# Patient Record
Sex: MALE | Race: BLACK OR AFRICAN AMERICAN | Employment: FULL TIME | URBAN - METROPOLITAN AREA
[De-identification: names, ages, dates, MRNs, and addresses within clinical notes are randomized per-mention and may not be internally consistent; named-entity substitution may affect disease eponyms.]

---

## 2018-10-22 ENCOUNTER — HOSPITAL ENCOUNTER (EMERGENCY)
Facility: HOSPITAL | Age: 33
Discharge: HOME | End: 2018-10-22
Attending: EMERGENCY MEDICINE
Payer: MEDICAID

## 2018-10-22 ENCOUNTER — APPOINTMENT (OUTPATIENT)
Dept: GENERAL RADIOLOGY | Facility: HOSPITAL | Age: 33
End: 2018-10-22
Attending: EMERGENCY MEDICINE
Payer: MEDICAID

## 2018-10-22 VITALS
SYSTOLIC BLOOD PRESSURE: 114 MMHG | WEIGHT: 185 LBS | BODY MASS INDEX: 28.04 KG/M2 | HEIGHT: 68 IN | OXYGEN SATURATION: 100 % | DIASTOLIC BLOOD PRESSURE: 60 MMHG | RESPIRATION RATE: 16 BRPM | HEART RATE: 51 BPM | TEMPERATURE: 98 F

## 2018-10-22 DIAGNOSIS — K59.00 CONSTIPATION, UNSPECIFIED CONSTIPATION TYPE: Primary | ICD-10-CM

## 2018-10-22 PROCEDURE — 99284 EMERGENCY DEPT VISIT MOD MDM: CPT

## 2018-10-22 PROCEDURE — 74018 RADEX ABDOMEN 1 VIEW: CPT | Performed by: EMERGENCY MEDICINE

## 2018-10-22 PROCEDURE — 85025 COMPLETE CBC W/AUTO DIFF WBC: CPT | Performed by: EMERGENCY MEDICINE

## 2018-10-22 PROCEDURE — 81003 URINALYSIS AUTO W/O SCOPE: CPT | Performed by: EMERGENCY MEDICINE

## 2018-10-22 PROCEDURE — 36415 COLL VENOUS BLD VENIPUNCTURE: CPT

## 2018-10-22 PROCEDURE — 83690 ASSAY OF LIPASE: CPT | Performed by: EMERGENCY MEDICINE

## 2018-10-22 PROCEDURE — 80053 COMPREHEN METABOLIC PANEL: CPT | Performed by: EMERGENCY MEDICINE

## 2018-10-22 RX ORDER — PANTOPRAZOLE SODIUM 40 MG/1
40 TABLET, DELAYED RELEASE ORAL DAILY
Qty: 30 TABLET | Refills: 0 | Status: SHIPPED | OUTPATIENT
Start: 2018-10-22 | End: 2018-10-22

## 2018-10-22 RX ORDER — DOCUSATE SODIUM 100 MG/1
100 CAPSULE, LIQUID FILLED ORAL 2 TIMES DAILY
Qty: 30 CAPSULE | Refills: 0 | Status: SHIPPED | OUTPATIENT
Start: 2018-10-22 | End: 2018-11-06

## 2018-10-22 NOTE — ED INITIAL ASSESSMENT (HPI)
27 y/o male to ED with c/o of abdominal pain for \"months\". Patient reports pain has worsened, [facility], reports all they did was blood work and urine.  Patient was given maalox,

## 2018-10-23 NOTE — ED PROVIDER NOTES
Patient Seen in: [facility] Emergency Department    History   Patient presents with:  Abdomen/Flank Pain (GI/)    Stated Complaint: abdominal pain for few months    HPI    41-year-old male presents with abdominal pains.   He reports pain is been pr exchange and clear   Heart: regular rate rhythm and no murmur   Abdomen: Soft and nontender. Normal bowel sounds throughout. No abdominal masses.   No peritoneal signs   Extremities: no edema, normal peripheral pulses   Neuro: Alert oriented and nonfocal correlation to exclude constipation. Dictated by: Westly Meckel, MD on 10/22/2018 at 21:00     Approved by: Westly Meckel, MD                MDM   49-year-old male presents with chronic abdominal pain. He has an unremarkable exam at this time.   He repo

## 2021-03-09 PROCEDURE — 99283 EMERGENCY DEPT VISIT LOW MDM: CPT

## 2021-03-09 PROCEDURE — 96372 THER/PROPH/DIAG INJ SC/IM: CPT

## 2021-03-10 ENCOUNTER — HOSPITAL ENCOUNTER (EMERGENCY)
Facility: HOSPITAL | Age: 36
Discharge: HOME | End: 2021-03-10
Attending: EMERGENCY MEDICINE
Payer: MEDICAID

## 2021-03-10 VITALS
OXYGEN SATURATION: 99 % | RESPIRATION RATE: 18 BRPM | SYSTOLIC BLOOD PRESSURE: 139 MMHG | DIASTOLIC BLOOD PRESSURE: 76 MMHG | BODY MASS INDEX: 25.92 KG/M2 | WEIGHT: 175 LBS | HEART RATE: 73 BPM | TEMPERATURE: 98 F | HEIGHT: 69 IN

## 2021-03-10 VITALS
TEMPERATURE: 98 F | BODY MASS INDEX: 25.92 KG/M2 | RESPIRATION RATE: 18 BRPM | OXYGEN SATURATION: 100 % | DIASTOLIC BLOOD PRESSURE: 100 MMHG | SYSTOLIC BLOOD PRESSURE: 150 MMHG | WEIGHT: 175 LBS | HEART RATE: 62 BPM | HEIGHT: 69 IN

## 2021-03-10 DIAGNOSIS — N50.89 ULCERS OF GENITAL ORGAN IN MALE: Primary | ICD-10-CM

## 2021-03-10 DIAGNOSIS — A53.9 SYPHILIS: Primary | ICD-10-CM

## 2021-03-10 DIAGNOSIS — R59.0 INGUINAL LYMPHADENOPATHY: ICD-10-CM

## 2021-03-10 DIAGNOSIS — A53.9 SYPHILIS: ICD-10-CM

## 2021-03-10 LAB — T PALLIDUM AB SER QL IA: REACTIVE

## 2021-03-10 PROCEDURE — 96372 THER/PROPH/DIAG INJ SC/IM: CPT | Performed by: EMERGENCY MEDICINE

## 2021-03-10 PROCEDURE — 86780 TREPONEMA PALLIDUM: CPT | Performed by: EMERGENCY MEDICINE

## 2021-03-10 PROCEDURE — 99283 EMERGENCY DEPT VISIT LOW MDM: CPT | Performed by: EMERGENCY MEDICINE

## 2021-03-10 PROCEDURE — 86592 SYPHILIS TEST NON-TREP QUAL: CPT | Performed by: EMERGENCY MEDICINE

## 2021-03-10 RX ORDER — VALACYCLOVIR HYDROCHLORIDE 1 G/1
1 TABLET, FILM COATED ORAL EVERY 12 HOURS
Qty: 20 TABLET | Refills: 0 | Status: SHIPPED | OUTPATIENT
Start: 2021-03-10 | End: 2021-03-20

## 2021-03-10 NOTE — ED PROVIDER NOTES
Patient Seen in: [facility] Emergency Department      History   Patient presents with:  Medication Administration    Stated Complaint: needs im inj    HPI/Subjective:   HPI    Patient is a 24-year-old male called back to receive subsequent dose peni alert and oriented to person, place, and time. Moves all 4 extremities appropriately. Skin: Skin is warm and dry.     ED Course   Labs Reviewed - No data to display       I reviewed the records as part of her daily culture review prior to the patient's ar

## 2021-03-10 NOTE — ED INITIAL ASSESSMENT (HPI)
78-year-old male c/o 3 painful sores on the head and shaft of his penis, and a supra pubic non painful bump which he noticed today in the shower. A&Ox4. Denies any N/V/D.

## 2021-03-10 NOTE — ED PROVIDER NOTES
Patient Seen in: [facility] Emergency Department      History   Patient presents with:  Trinh-SOFIA    Stated Complaint: c/o \"bump above my privates and 3 sores to my penis since Friday. \"     HPI/Subjective:   HPI    69-year-old male presents emergency after the exam.  Stethoscope and any equipment used during my examination was cleaned with super sani-cloth germicidal wipes following the exam.         Vital signs reviewed  General appearance: Patient is alert and in no acute distress  HEENT: Vy Ross instructed to take medications as prescribed. Patient is aware that they are to return to ED if any worsening problems. Patient was also instructed to followup with the appropriate physician. Patient verbalizes and agrees with plan.   Patient discharged

## 2021-03-11 LAB — RPR SER QL: NONREACTIVE

## 2021-03-13 LAB — TREPONEMA PALLIDUM AB BY TP-PA: REACTIVE

## 2023-05-12 ENCOUNTER — HOSPITAL ENCOUNTER (EMERGENCY)
Age: 38
Discharge: HOME | End: 2023-05-12
Attending: EMERGENCY MEDICINE
Payer: MEDICAID

## 2023-05-12 VITALS
DIASTOLIC BLOOD PRESSURE: 73 MMHG | OXYGEN SATURATION: 99 % | BODY MASS INDEX: 26 KG/M2 | SYSTOLIC BLOOD PRESSURE: 128 MMHG | TEMPERATURE: 98 F | WEIGHT: 175 LBS | HEART RATE: 77 BPM | RESPIRATION RATE: 16 BRPM

## 2023-05-12 DIAGNOSIS — N48.5 PENILE ULCER: Primary | ICD-10-CM

## 2023-05-12 DIAGNOSIS — R21 MACULOPAPULAR RASH: ICD-10-CM

## 2023-05-12 LAB
ALBUMIN SERPL-MCNC: 3.6 G/DL (ref 3.4–5)
ALBUMIN/GLOB SERPL: 0.8 {RATIO} (ref 1–2)
ALP LIVER SERPL-CCNC: 102 U/L
ALT SERPL-CCNC: 25 U/L
ANION GAP SERPL CALC-SCNC: 2 MMOL/L (ref 0–18)
AST SERPL-CCNC: 19 U/L (ref 15–37)
BASOPHILS # BLD AUTO: 0.01 X10(3) UL (ref 0–0.2)
BASOPHILS NFR BLD AUTO: 0.2 %
BILIRUB SERPL-MCNC: 0.4 MG/DL (ref 0.1–2)
BUN BLD-MCNC: 9 MG/DL (ref 7–18)
CALCIUM BLD-MCNC: 9.3 MG/DL (ref 8.5–10.1)
CHLORIDE SERPL-SCNC: 106 MMOL/L (ref 98–112)
CO2 SERPL-SCNC: 29 MMOL/L (ref 21–32)
CREAT BLD-MCNC: 1.13 MG/DL
EOSINOPHIL # BLD AUTO: 0.12 X10(3) UL (ref 0–0.7)
EOSINOPHIL NFR BLD AUTO: 2.5 %
ERYTHROCYTE [DISTWIDTH] IN BLOOD BY AUTOMATED COUNT: 11.8 %
GFR SERPLBLD BASED ON 1.73 SQ M-ARVRAT: 84 ML/MIN/1.73M2 (ref 60–?)
GLOBULIN PLAS-MCNC: 4.3 G/DL (ref 2.8–4.4)
GLUCOSE BLD-MCNC: 103 MG/DL (ref 70–99)
HCT VFR BLD AUTO: 41.8 %
HGB BLD-MCNC: 14.7 G/DL
IMM GRANULOCYTES # BLD AUTO: 0.01 X10(3) UL (ref 0–1)
IMM GRANULOCYTES NFR BLD: 0.2 %
LYMPHOCYTES # BLD AUTO: 1.41 X10(3) UL (ref 1–4)
LYMPHOCYTES NFR BLD AUTO: 29.9 %
MCH RBC QN AUTO: 33.2 PG (ref 26–34)
MCHC RBC AUTO-ENTMCNC: 35.2 G/DL (ref 31–37)
MCV RBC AUTO: 94.4 FL
MONOCYTES # BLD AUTO: 0.58 X10(3) UL (ref 0.1–1)
MONOCYTES NFR BLD AUTO: 12.3 %
NEUTROPHILS # BLD AUTO: 2.58 X10 (3) UL (ref 1.5–7.7)
NEUTROPHILS # BLD AUTO: 2.58 X10(3) UL (ref 1.5–7.7)
NEUTROPHILS NFR BLD AUTO: 54.9 %
OSMOLALITY SERPL CALC.SUM OF ELEC: 283 MOSM/KG (ref 275–295)
PLATELET # BLD AUTO: 306 10(3)UL (ref 150–450)
POTASSIUM SERPL-SCNC: 4.2 MMOL/L (ref 3.5–5.1)
PROT SERPL-MCNC: 7.9 G/DL (ref 6.4–8.2)
RBC # BLD AUTO: 4.43 X10(6)UL
SODIUM SERPL-SCNC: 137 MMOL/L (ref 136–145)
T PALLIDUM AB SER QL IA: REACTIVE
WBC # BLD AUTO: 4.7 X10(3) UL (ref 4–11)

## 2023-05-12 PROCEDURE — 87529 HSV DNA AMP PROBE: CPT | Performed by: EMERGENCY MEDICINE

## 2023-05-12 PROCEDURE — 86592 SYPHILIS TEST NON-TREP QUAL: CPT | Performed by: EMERGENCY MEDICINE

## 2023-05-12 PROCEDURE — 85025 COMPLETE CBC W/AUTO DIFF WBC: CPT | Performed by: EMERGENCY MEDICINE

## 2023-05-12 PROCEDURE — 87593 ORTHOPOXVIRUS AMP PRB EACH: CPT | Performed by: EMERGENCY MEDICINE

## 2023-05-12 PROCEDURE — 96360 HYDRATION IV INFUSION INIT: CPT

## 2023-05-12 PROCEDURE — 99284 EMERGENCY DEPT VISIT MOD MDM: CPT

## 2023-05-12 PROCEDURE — 87798 DETECT AGENT NOS DNA AMP: CPT | Performed by: EMERGENCY MEDICINE

## 2023-05-12 PROCEDURE — 86780 TREPONEMA PALLIDUM: CPT | Performed by: EMERGENCY MEDICINE

## 2023-05-12 PROCEDURE — 80053 COMPREHEN METABOLIC PANEL: CPT | Performed by: EMERGENCY MEDICINE

## 2023-05-12 PROCEDURE — 86593 SYPHILIS TEST NON-TREP QUANT: CPT | Performed by: EMERGENCY MEDICINE

## 2023-05-12 RX ORDER — SODIUM CHLORIDE 9 MG/ML
INJECTION, SOLUTION INTRAVENOUS ONCE
Status: COMPLETED | OUTPATIENT
Start: 2023-05-12 | End: 2023-05-12

## 2023-05-12 NOTE — DISCHARGE INSTRUCTIONS
Please practice safe sex at home  Benadryl for itching at home  Return to the ER if symptoms worsen or if any other problems arise

## 2023-05-12 NOTE — ED QUICK NOTES
In depth education provided by RN on isolation guidelines pending test results.  Pt verbalized an understanding

## 2023-05-13 LAB — RPR SER QL: REACTIVE

## 2023-05-13 NOTE — ED NOTES
Pt called and notified of results and need to follow up with primary care or health department.   Voiced understanding of all instructions

## 2023-05-13 NOTE — ED NOTES
Pt notified again that he will need to be seen by dr. Home Hunter on Monday or go to the health department and be retreated.   Pt voiced understanding of all instructions

## 2023-05-15 LAB
HSV1 DNA SPEC QL NAA+PROBE: NEGATIVE
HSV2 DNA SPEC QL NAA+PROBE: NEGATIVE

## 2023-05-17 LAB — V ZOSTER PCR REAL TIME: NEGATIVE

## 2023-05-21 NOTE — ED NOTES
Patient friend calling on behalf of patient - patient heard screaming obscenities in background. Patient did give verbal consent for this RN to discuss patient care with patient friend. Patient friend inquiring if patient can come back and be treated in department since he has not yet been treated. Patient has appt. tomorrow with Dr. Cady Geronimo. Patient requesting to come to ED for antibiotic treatment - patient informed that he may return for treatment, but will need to check in. Patient and patient friend verbalized understanding.

## (undated) NOTE — ED AVS SNAPSHOT
Susan Landry   MRN: [id number]    Department:  [facility] Emergency Department   Date of Visit:  10/22/2018           Disclosure     Insurance plans vary and the physician(s) referred by the ER may not be covered by your plan.  Please contact your tell this physician (or your personal doctor if your instructions are to return to your personal doctor) about any new or lasting problems. The primary care or specialist physician will see patients referred from the [facility] Emergency Department.  Bradford Hurley

## (undated) NOTE — LETTER
Date & Time: 10/22/2018, 10:14 PM  Patient: Ratna Hernandez  Encounter Provider(s):    Jen Demarco MD       To Whom It May Concern:    Ratna Hernandez was seen and treated in our department on 10/22/2018. He can return to work.     If you have any questions